# Patient Record
Sex: MALE | Race: WHITE | HISPANIC OR LATINO | Employment: FULL TIME | URBAN - METROPOLITAN AREA
[De-identification: names, ages, dates, MRNs, and addresses within clinical notes are randomized per-mention and may not be internally consistent; named-entity substitution may affect disease eponyms.]

---

## 2023-05-22 ENCOUNTER — LAB (OUTPATIENT)
Dept: LAB | Facility: HOSPITAL | Age: 48
End: 2023-05-22
Payer: COMMERCIAL

## 2023-05-22 ENCOUNTER — OFFICE VISIT (OUTPATIENT)
Dept: FAMILY MEDICINE CLINIC | Facility: CLINIC | Age: 48
End: 2023-05-22
Payer: COMMERCIAL

## 2023-05-22 VITALS
BODY MASS INDEX: 34.04 KG/M2 | DIASTOLIC BLOOD PRESSURE: 84 MMHG | OXYGEN SATURATION: 96 % | TEMPERATURE: 98.4 F | HEART RATE: 68 BPM | RESPIRATION RATE: 18 BRPM | WEIGHT: 224.6 LBS | SYSTOLIC BLOOD PRESSURE: 120 MMHG | HEIGHT: 68 IN

## 2023-05-22 DIAGNOSIS — R14.0 BLOATING: ICD-10-CM

## 2023-05-22 DIAGNOSIS — H92.01 EAR PAIN, REFERRED, RIGHT: ICD-10-CM

## 2023-05-22 DIAGNOSIS — E55.9 VITAMIN D DEFICIENCY: ICD-10-CM

## 2023-05-22 DIAGNOSIS — Z00.00 ENCOUNTER FOR PHYSICAL EXAMINATION: Primary | ICD-10-CM

## 2023-05-22 DIAGNOSIS — H92.01 EAR PAIN, RIGHT: ICD-10-CM

## 2023-05-22 DIAGNOSIS — Z00.00 ENCOUNTER FOR PHYSICAL EXAMINATION: ICD-10-CM

## 2023-05-22 DIAGNOSIS — H65.91 OTITIS MEDIA WITH EFFUSION, RIGHT: ICD-10-CM

## 2023-05-22 LAB
25(OH)D3 SERPL-MCNC: 15.6 NG/ML (ref 30–100)
ALBUMIN SERPL-MCNC: 4.6 G/DL (ref 3.5–5.2)
ALBUMIN/GLOB SERPL: 1.7 G/DL
ALP SERPL-CCNC: 79 U/L (ref 39–117)
ALT SERPL W P-5'-P-CCNC: 34 U/L (ref 1–41)
AMYLASE SERPL-CCNC: 55 U/L (ref 28–100)
ANION GAP SERPL CALCULATED.3IONS-SCNC: 11.4 MMOL/L (ref 5–15)
AST SERPL-CCNC: 22 U/L (ref 1–40)
BASOPHILS # BLD AUTO: 0.07 10*3/MM3 (ref 0–0.2)
BASOPHILS NFR BLD AUTO: 1 % (ref 0–1.5)
BILIRUB SERPL-MCNC: 0.6 MG/DL (ref 0–1.2)
BUN SERPL-MCNC: 13 MG/DL (ref 6–20)
BUN/CREAT SERPL: 12.4 (ref 7–25)
CALCIUM SPEC-SCNC: 9.4 MG/DL (ref 8.6–10.5)
CHLORIDE SERPL-SCNC: 104 MMOL/L (ref 98–107)
CHOLEST SERPL-MCNC: 213 MG/DL (ref 0–200)
CK SERPL-CCNC: 172 U/L (ref 20–200)
CO2 SERPL-SCNC: 25.6 MMOL/L (ref 22–29)
CREAT SERPL-MCNC: 1.05 MG/DL (ref 0.76–1.27)
DEPRECATED RDW RBC AUTO: 41.4 FL (ref 37–54)
EGFRCR SERPLBLD CKD-EPI 2021: 87.6 ML/MIN/1.73
EOSINOPHIL # BLD AUTO: 0.11 10*3/MM3 (ref 0–0.4)
EOSINOPHIL NFR BLD AUTO: 1.6 % (ref 0.3–6.2)
ERYTHROCYTE [DISTWIDTH] IN BLOOD BY AUTOMATED COUNT: 13.5 % (ref 12.3–15.4)
GLOBULIN UR ELPH-MCNC: 2.7 GM/DL
GLUCOSE SERPL-MCNC: 100 MG/DL (ref 65–99)
HBA1C MFR BLD: 5.6 % (ref 4.8–5.6)
HCT VFR BLD AUTO: 47.1 % (ref 37.5–51)
HCV AB SER DONR QL: NORMAL
HDLC SERPL-MCNC: 33 MG/DL (ref 40–60)
HGB BLD-MCNC: 16.1 G/DL (ref 13–17.7)
IMM GRANULOCYTES # BLD AUTO: 0.03 10*3/MM3 (ref 0–0.05)
IMM GRANULOCYTES NFR BLD AUTO: 0.4 % (ref 0–0.5)
LDLC SERPL CALC-MCNC: 106 MG/DL (ref 0–100)
LDLC/HDLC SERPL: 2.83 {RATIO}
LIPASE SERPL-CCNC: 33 U/L (ref 13–60)
LYMPHOCYTES # BLD AUTO: 2.95 10*3/MM3 (ref 0.7–3.1)
LYMPHOCYTES NFR BLD AUTO: 41.6 % (ref 19.6–45.3)
MCH RBC QN AUTO: 29.1 PG (ref 26.6–33)
MCHC RBC AUTO-ENTMCNC: 34.2 G/DL (ref 31.5–35.7)
MCV RBC AUTO: 85.2 FL (ref 79–97)
MONOCYTES # BLD AUTO: 0.63 10*3/MM3 (ref 0.1–0.9)
MONOCYTES NFR BLD AUTO: 8.9 % (ref 5–12)
NEUTROPHILS NFR BLD AUTO: 3.3 10*3/MM3 (ref 1.7–7)
NEUTROPHILS NFR BLD AUTO: 46.5 % (ref 42.7–76)
NRBC BLD AUTO-RTO: 0 /100 WBC (ref 0–0.2)
PLATELET # BLD AUTO: 234 10*3/MM3 (ref 140–450)
PMV BLD AUTO: 10.4 FL (ref 6–12)
POTASSIUM SERPL-SCNC: 3.6 MMOL/L (ref 3.5–5.2)
PROT SERPL-MCNC: 7.3 G/DL (ref 6–8.5)
RBC # BLD AUTO: 5.53 10*6/MM3 (ref 4.14–5.8)
SODIUM SERPL-SCNC: 141 MMOL/L (ref 136–145)
TRIGL SERPL-MCNC: 433 MG/DL (ref 0–150)
TSH SERPL DL<=0.05 MIU/L-ACNC: 1.2 UIU/ML (ref 0.27–4.2)
VLDLC SERPL-MCNC: 74 MG/DL (ref 5–40)
WBC NRBC COR # BLD: 7.09 10*3/MM3 (ref 3.4–10.8)

## 2023-05-22 PROCEDURE — 80053 COMPREHEN METABOLIC PANEL: CPT

## 2023-05-22 PROCEDURE — 83690 ASSAY OF LIPASE: CPT

## 2023-05-22 PROCEDURE — 82550 ASSAY OF CK (CPK): CPT

## 2023-05-22 PROCEDURE — 80061 LIPID PANEL: CPT

## 2023-05-22 PROCEDURE — 82150 ASSAY OF AMYLASE: CPT

## 2023-05-22 PROCEDURE — 83036 HEMOGLOBIN GLYCOSYLATED A1C: CPT

## 2023-05-22 PROCEDURE — 84443 ASSAY THYROID STIM HORMONE: CPT

## 2023-05-22 PROCEDURE — 86803 HEPATITIS C AB TEST: CPT

## 2023-05-22 PROCEDURE — 82306 VITAMIN D 25 HYDROXY: CPT

## 2023-05-22 PROCEDURE — 83013 H PYLORI (C-13) BREATH: CPT

## 2023-05-22 PROCEDURE — 85025 COMPLETE CBC W/AUTO DIFF WBC: CPT

## 2023-05-22 RX ORDER — AMOXICILLIN AND CLAVULANATE POTASSIUM 875; 125 MG/1; MG/1
1 TABLET, FILM COATED ORAL 2 TIMES DAILY
Qty: 10 TABLET | Refills: 0 | Status: SHIPPED | OUTPATIENT
Start: 2023-05-22 | End: 2023-05-27

## 2023-05-22 NOTE — PROGRESS NOTES
"      Subjective     Primary concern  Annual physical exam  Bloating  Ear pain    Establish Care (Previous PCP in [place]. Pt will look for immunization records etc.)    Subjective          HPI:   History of Present Illness   Annual physical exam: Patient is a 47-year-old male who presents today with a primary concern of annual exam.  He moved to [place] from [place] in 2021.  In [place] he had a primary care physician that he saw routinely.  He has not seen a primary care provider since moving to [place].  Now that he has established medical insurance, he would like to address some of his healthcare concerns.  He acknowledges the need of dietary improvements as his diet consist mainly of Italian food and excessive carbohydrates.  He does not have an exercise regimen and states \"I do not have time.\"  His sleep hygiene generally includes 7 hours of sleep nightly.      Bloating: Onset was 3 years ago.  Bloating occurs with every meal.  Patient states I thought this was because a had a slow metabolism.  Associated symptoms include excessive belching.  Alleviating factors include belching, position changes (standing and sitting up), and Pepcid.  Aggravating factors include lying down, sitting, and foods like watermelon, oranges, and tomatoes.  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  Diet consists mainly of Italian foods with lots of carbohydrates.  After eating, he generally has ago to the restroom to defecate within 10 minutes.  He denies excessive caffeine intake and NSAID use.  In [place] he was established with a gastroenterologist, and says they were going to perform a EGD.      Right ear pain: Onset was approximately 15 days ago.  He went to urgent care initially for left ear itching.  It was determined he had fluid behind the tympanic membrane of both ears and he was prescribed steroids.  The current pain in his right ear is described as a dull ache.  " He denies any hearing loss.  Right ear pain is continuous throughout the day.  He has not tried any over-the-counter medications for symptom relief.       The following portions of the patient's history were reviewed and updated as appropriate: allergies, current medications, past family history, past medical history, past social history, past surgical history and problem list.    Allergy:   No Known Allergies     Current Medications:   Current Outpatient Medications   Medication Sig Dispense Refill   • famotidine (PEPCID) 20 MG tablet Take 1 tablet by mouth 2 (Two) Times a Day.     • amoxicillin-clavulanate (Augmentin) 875-125 MG per tablet Take 1 tablet by mouth 2 (Two) Times a Day for 5 days. 10 tablet 0     No current facility-administered medications for this visit.       Past Medical History:  Past Medical History:   Diagnosis Date   • Chronic back pain    • COVID-19 2019   • Hyperlipidemia    • Vitamin D deficiency 2021       Past Surgical History:  History reviewed. No pertinent surgical history.    Social History:  Social History     Socioeconomic History   • Marital status:    Tobacco Use   • Smoking status: Never     Passive exposure: Never   • Smokeless tobacco: Never   Vaping Use   • Vaping Use: Never used   Substance and Sexual Activity   • Alcohol use: Never   • Drug use: Never   • Sexual activity: Yes     Partners: Female     Birth control/protection: I.U.D.       Family History:  Family History   Problem Relation Age of Onset   • Heart disease Father        Review of Systems:  Review of Systems   Constitutional: Negative for chills, fatigue, fever and unexpected weight change.   HENT: Positive for congestion and ear pain (right side). Negative for ear discharge, hearing loss, postnasal drip, sinus pressure, sneezing, sore throat and trouble swallowing.    Eyes: Positive for visual disturbance. Negative for discharge and redness.   Respiratory: Negative for cough, choking, chest tightness,  "shortness of breath and wheezing.    Cardiovascular: Negative for chest pain and palpitations.   Gastrointestinal: Negative for abdominal distention, abdominal pain, blood in stool, constipation, diarrhea, nausea, rectal pain and vomiting.   Endocrine: Negative for cold intolerance, heat intolerance, polydipsia, polyphagia and polyuria.   Genitourinary: Negative for difficulty urinating, dysuria, flank pain, scrotal swelling and testicular pain.   Musculoskeletal: Positive for back pain (chronic ). Negative for arthralgias, neck pain and neck stiffness.   Skin: Negative for color change and rash.   Allergic/Immunologic: Negative for environmental allergies.   Neurological: Negative for dizziness, seizures, light-headedness and headaches.   Psychiatric/Behavioral: Negative for dysphoric mood, self-injury, sleep disturbance and suicidal ideas. The patient is not nervous/anxious.      Objective       Vital Signs:   /84   Pulse 68   Temp 98.4 °F (36.9 °C) (Infrared)   Resp 18   Ht 172.7 cm (68\")   Wt 102 kg (224 lb 9.6 oz)   SpO2 96%   BMI 34.15 kg/m²      Physical Exam:  Physical Exam  Constitutional:       General: He is not in acute distress.     Appearance: He is not ill-appearing, toxic-appearing or diaphoretic.   HENT:      Head: Normocephalic and atraumatic.      Right Ear: No decreased hearing noted. No swelling. A middle ear effusion is present. Tympanic membrane is erythematous. Tympanic membrane is not retracted or bulging.      Left Ear: No decreased hearing noted. No swelling.  No middle ear effusion. Tympanic membrane is not erythematous, retracted or bulging.      Nose: Nose normal.      Mouth/Throat:      Mouth: Mucous membranes are moist.      Pharynx: Oropharynx is clear. No oropharyngeal exudate or posterior oropharyngeal erythema.   Eyes:      Extraocular Movements: Extraocular movements intact.      Conjunctiva/sclera: Conjunctivae normal.      Pupils: Pupils are equal, round, and " reactive to light.   Cardiovascular:      Rate and Rhythm: Normal rate and regular rhythm.      Pulses: Normal pulses.      Heart sounds: Normal heart sounds.   Pulmonary:      Effort: Pulmonary effort is normal.      Breath sounds: Normal breath sounds.   Abdominal:      General: Abdomen is flat. Bowel sounds are normal. There is no distension.      Palpations: Abdomen is soft.      Tenderness: There is no abdominal tenderness. There is no right CVA tenderness, left CVA tenderness, guarding or rebound.   Musculoskeletal:         General: Normal range of motion.      Cervical back: Normal range of motion and neck supple.   Skin:     General: Skin is warm and dry.      Capillary Refill: Capillary refill takes less than 2 seconds.   Neurological:      Mental Status: He is alert and oriented to person, place, and time.   Psychiatric:         Mood and Affect: Mood normal.         Behavior: Behavior normal.         Thought Content: Thought content normal.       PHQ-9 Total Score: 0   VALDO-7 total score: 0  Lab Review  Lab on 05/22/2023   Component Date Value Ref Range Status   • Amylase 05/22/2023 55  28 - 100 U/L Final   • Lipase 05/22/2023 33  13 - 60 U/L Final   • Glucose 05/22/2023 100 (H)  65 - 99 mg/dL Final   • BUN 05/22/2023 13  6 - 20 mg/dL Final   • Creatinine 05/22/2023 1.05  0.76 - 1.27 mg/dL Final   • Sodium 05/22/2023 141  136 - 145 mmol/L Final   • Potassium 05/22/2023 3.6  3.5 - 5.2 mmol/L Final   • Chloride 05/22/2023 104  98 - 107 mmol/L Final   • CO2 05/22/2023 25.6  22.0 - 29.0 mmol/L Final   • Calcium 05/22/2023 9.4  8.6 - 10.5 mg/dL Final   • Total Protein 05/22/2023 7.3  6.0 - 8.5 g/dL Final   • Albumin 05/22/2023 4.6  3.5 - 5.2 g/dL Final   • ALT (SGPT) 05/22/2023 34  1 - 41 U/L Final   • AST (SGOT) 05/22/2023 22  1 - 40 U/L Final   • Alkaline Phosphatase 05/22/2023 79  39 - 117 U/L Final   • Total Bilirubin 05/22/2023 0.6  0.0 - 1.2 mg/dL Final   • Globulin 05/22/2023 2.7  gm/dL Final   • A/G  Ratio 05/22/2023 1.7  g/dL Final   • BUN/Creatinine Ratio 05/22/2023 12.4  7.0 - 25.0 Final   • Anion Gap 05/22/2023 11.4  5.0 - 15.0 mmol/L Final   • eGFR 05/22/2023 87.6  >60.0 mL/min/1.73 Final   • Hemoglobin A1C 05/22/2023 5.60  4.80 - 5.60 % Final   • Total Cholesterol 05/22/2023 213 (H)  0 - 200 mg/dL Final   • Triglycerides 05/22/2023 433 (H)  0 - 150 mg/dL Final   • HDL Cholesterol 05/22/2023 33 (L)  40 - 60 mg/dL Final   • LDL Cholesterol  05/22/2023 106 (H)  0 - 100 mg/dL Final   • VLDL Cholesterol 05/22/2023 74 (H)  5 - 40 mg/dL Final   • LDL/HDL Ratio 05/22/2023 2.83   Final   • TSH 05/22/2023 1.200  0.270 - 4.200 uIU/mL Final   • 25 Hydroxy, Vitamin D 05/22/2023 15.6 (L)  30.0 - 100.0 ng/ml Final   • Creatine Kinase 05/22/2023 172  20 - 200 U/L Final   • Hepatitis C Ab 05/22/2023 Non-Reactive  Non-Reactive Final   • WBC 05/22/2023 7.09  3.40 - 10.80 10*3/mm3 Final   • RBC 05/22/2023 5.53  4.14 - 5.80 10*6/mm3 Final   • Hemoglobin 05/22/2023 16.1  13.0 - 17.7 g/dL Final   • Hematocrit 05/22/2023 47.1  37.5 - 51.0 % Final   • MCV 05/22/2023 85.2  79.0 - 97.0 fL Final   • MCH 05/22/2023 29.1  26.6 - 33.0 pg Final   • MCHC 05/22/2023 34.2  31.5 - 35.7 g/dL Final   • RDW 05/22/2023 13.5  12.3 - 15.4 % Final   • RDW-SD 05/22/2023 41.4  37.0 - 54.0 fl Final   • MPV 05/22/2023 10.4  6.0 - 12.0 fL Final   • Platelets 05/22/2023 234  140 - 450 10*3/mm3 Final   • Neutrophil % 05/22/2023 46.5  42.7 - 76.0 % Final   • Lymphocyte % 05/22/2023 41.6  19.6 - 45.3 % Final   • Monocyte % 05/22/2023 8.9  5.0 - 12.0 % Final   • Eosinophil % 05/22/2023 1.6  0.3 - 6.2 % Final   • Basophil % 05/22/2023 1.0  0.0 - 1.5 % Final   • Immature Grans % 05/22/2023 0.4  0.0 - 0.5 % Final   • Neutrophils, Absolute 05/22/2023 3.30  1.70 - 7.00 10*3/mm3 Final   • Lymphocytes, Absolute 05/22/2023 2.95  0.70 - 3.10 10*3/mm3 Final   • Monocytes, Absolute 05/22/2023 0.63  0.10 - 0.90 10*3/mm3 Final   • Eosinophils, Absolute 05/22/2023 0.11   0.00 - 0.40 10*3/mm3 Final   • Basophils, Absolute 05/22/2023 0.07  0.00 - 0.20 10*3/mm3 Final   • Immature Grans, Absolute 05/22/2023 0.03  0.00 - 0.05 10*3/mm3 Final   • nRBC 05/22/2023 0.0  0.0 - 0.2 /100 WBC Final      Assessment / Plan         Assessment and Plan   Diagnoses and all orders for this visit:    1. Encounter for physical examination (Primary)  -     Comprehensive Metabolic Panel; Future  -     Hemoglobin A1c; Future  -     Lipid Panel; Future  -     TSH Rfx On Abnormal To Free T4; Future  -     Ambulatory Referral For Screening Colonoscopy  -     Hepatitis C antibody; Future    2. Bloating  -     Amylase; Future  -     Lipase; Future  -     H. Pylori Breath Test - Breath, Lung; Future  -     Comprehensive Metabolic Panel; Future  -     Lipid Panel; Future  -     CK; Future  -     Ambulatory Referral For Screening Colonoscopy    3. Otitis media with effusion, right  -     amoxicillin-clavulanate (Augmentin) 875-125 MG per tablet; Take 1 tablet by mouth 2 (Two) Times a Day for 5 days.  Dispense: 10 tablet; Refill: 0    4. Ear pain, right  -     CBC & Differential; Future    5. Vitamin D deficiency  -     Vitamin D,25-Hydroxy; Future      Patient was given instructions and counseling regarding his condition or for health maintenance advice. Please see specific information pulled into the AVS if appropriate.     Follow Up   Return As needed.    This document has been electronically signed by ADRIEN Weber   May 23, 2023 04:44 EDT    Dictated Utilizing Dragon Dictation: Part of this note may be an electronic transcription/translation of spoken language to printed text using the Dragon Dictation System.    Víctor Harris  [facility] [place]

## 2023-05-23 DIAGNOSIS — E78.1 HIGH BLOOD TRIGLYCERIDES: Primary | ICD-10-CM

## 2023-05-23 PROBLEM — R14.0 BLOATING: Status: ACTIVE | Noted: 2023-05-23

## 2023-05-23 PROBLEM — Z00.00 ENCOUNTER FOR PHYSICAL EXAMINATION: Status: ACTIVE | Noted: 2023-05-23

## 2023-05-23 PROBLEM — E55.9 VITAMIN D DEFICIENCY: Status: ACTIVE | Noted: 2023-05-23

## 2023-05-23 LAB — UREA BREATH TEST QL: NEGATIVE

## 2023-05-23 RX ORDER — FAMOTIDINE 20 MG/1
20 TABLET, FILM COATED ORAL 2 TIMES DAILY
COMMUNITY

## 2023-05-30 ENCOUNTER — OFFICE VISIT (OUTPATIENT)
Dept: FAMILY MEDICINE CLINIC | Facility: CLINIC | Age: 48
End: 2023-05-30
Payer: COMMERCIAL

## 2023-05-30 VITALS
WEIGHT: 224 LBS | BODY MASS INDEX: 34.06 KG/M2 | RESPIRATION RATE: 17 BRPM | DIASTOLIC BLOOD PRESSURE: 90 MMHG | HEART RATE: 75 BPM | OXYGEN SATURATION: 98 % | TEMPERATURE: 98.4 F | SYSTOLIC BLOOD PRESSURE: 122 MMHG

## 2023-05-30 DIAGNOSIS — H53.9 VISUAL DISTURBANCE: ICD-10-CM

## 2023-05-30 DIAGNOSIS — E66.9 CLASS 1 OBESITY WITHOUT SERIOUS COMORBIDITY WITH BODY MASS INDEX (BMI) OF 34.0 TO 34.9 IN ADULT, UNSPECIFIED OBESITY TYPE: ICD-10-CM

## 2023-05-30 DIAGNOSIS — H66.003 NON-RECURRENT ACUTE SUPPURATIVE OTITIS MEDIA OF BOTH EARS WITHOUT SPONTANEOUS RUPTURE OF TYMPANIC MEMBRANES: Primary | ICD-10-CM

## 2023-05-30 PROCEDURE — 99213 OFFICE O/P EST LOW 20 MIN: CPT

## 2023-05-30 RX ORDER — CEFDINIR 300 MG/1
300 CAPSULE ORAL 2 TIMES DAILY
Qty: 10 CAPSULE | Refills: 0 | Status: CANCELLED | OUTPATIENT
Start: 2023-05-30

## 2023-05-30 RX ORDER — FAMOTIDINE 20 MG
1000 TABLET ORAL DAILY
Qty: 30 CAPSULE | Refills: 1 | Status: SHIPPED | OUTPATIENT
Start: 2023-05-30

## 2023-05-30 RX ORDER — DOXYCYCLINE 100 MG/1
100 TABLET ORAL 2 TIMES DAILY
Qty: 20 TABLET | Refills: 0 | Status: SHIPPED | OUTPATIENT
Start: 2023-05-30 | End: 2023-06-09

## 2023-05-30 RX ORDER — METHYLPREDNISOLONE 4 MG/1
TABLET ORAL
Qty: 21 TABLET | Refills: 0 | Status: SHIPPED | OUTPATIENT
Start: 2023-05-30 | End: 2023-06-04

## 2023-05-30 RX ORDER — FAMOTIDINE 20 MG
1000 TABLET ORAL ONCE
Qty: 1 CAPSULE | Refills: 0 | Status: SHIPPED | OUTPATIENT
Start: 2023-05-30 | End: 2023-05-30

## 2023-05-30 NOTE — PROGRESS NOTES
Subjective     Primary concern  Ear Fullness and Abnormal Lab    Subjective        HPI:   History of Present Illness  Patient is a 47-year-old male who presents with a primary concern of ear fullness.  He is requesting an irrigation at today's visit.  He has a history of excessive cerumen.  Patient tries to maintain cerumen buildup at home however is not uncommon that he need cerumen removal and irrigation at providers visits.  Patient was previously treated for ear infection on May 22, 2023, in which she was prescribed antibiotics and reports compliance.      Blurred vision: Patient unable to identify an initial onset, but he does state this is occurred before in the past.  He describes spontaneous episodes of blurry vision.  He is unable to identify any specific triggers.  He does not wear contacts or glasses.  Is been several years since he has seen an ophthalmologist.    The following portions of the patient's history were reviewed and updated as appropriate: allergies, current medications, past family history, past medical history, past social history, past surgical history and problem list.  Allergy:   No Known Allergies     Current Medications:   Current Outpatient Medications   Medication Sig Dispense Refill    famotidine (PEPCID) 20 MG tablet Take 1 tablet by mouth 2 (Two) Times a Day.      doxycycline (ADOXA) 100 MG tablet Take 1 tablet by mouth 2 (Two) Times a Day for 10 days. 20 tablet 0    Vitamin D, Cholecalciferol, 25 MCG (1000 UT) capsule Take 1 capsule by mouth Daily. 30 capsule 1     No current facility-administered medications for this visit.       Past Medical History:  Past Medical History:   Diagnosis Date    Chronic back pain     COVID-19 2019    Hyperlipidemia     Vitamin D deficiency 2021       Past Surgical History:  History reviewed. No pertinent surgical history.    Social History:  Social History     Socioeconomic History    Marital status:    Tobacco Use    Smoking status:  Never     Passive exposure: Never    Smokeless tobacco: Never   Vaping Use    Vaping Use: Never used   Substance and Sexual Activity    Alcohol use: Never    Drug use: Never    Sexual activity: Yes     Partners: Female     Birth control/protection: I.U.D.       Family History:  Family History   Problem Relation Age of Onset    Heart disease Father        Review of Systems:  Review of Systems   Constitutional:  Negative for chills, fatigue and fever.   HENT:  Positive for ear pain (bilateral- itchy, discomfort). Negative for congestion, ear discharge, hearing loss, rhinorrhea, sinus pressure, sinus pain, sneezing, sore throat and trouble swallowing.    Eyes:  Positive for visual disturbance (Blurred vision). Negative for discharge and itching.   Respiratory:  Negative for chest tightness and shortness of breath.    Cardiovascular:  Negative for chest pain.   Neurological:  Positive for headaches. Negative for dizziness, syncope and light-headedness.       Objective       Vital Signs:   /90   Pulse 75   Temp 98.4 °F (36.9 °C)   Resp 17   Wt 102 kg (224 lb)   SpO2 98%   BMI 34.06 kg/m²      Physical Exam:  Physical Exam  Constitutional:       General: He is not in acute distress.     Appearance: He is not ill-appearing, toxic-appearing or diaphoretic.   HENT:      Head: Normocephalic and atraumatic.      Right Ear: No decreased hearing noted. Tenderness present. No laceration, drainage or swelling. A middle ear effusion is present. There is no impacted cerumen. No foreign body. There is mastoid tenderness. Tympanic membrane is injected and erythematous. Tympanic membrane is not perforated.      Left Ear: No decreased hearing noted. No laceration, drainage, swelling or tenderness. A middle ear effusion is present. There is no impacted cerumen. No foreign body. No mastoid tenderness. Tympanic membrane is injected and erythematous. Tympanic membrane is not perforated.      Ears:      Comments: Excessive  cerumen noted in the left ear however tympanic membrane not occluded.     Nose: Congestion present. No rhinorrhea.      Mouth/Throat:      Mouth: Mucous membranes are moist.      Pharynx: Oropharynx is clear. Posterior oropharyngeal erythema present. No oropharyngeal exudate.   Eyes:      Conjunctiva/sclera: Conjunctivae normal.   Abdominal:      General: Abdomen is flat.      Palpations: Abdomen is soft.   Musculoskeletal:      Cervical back: Normal range of motion. No tenderness.   Lymphadenopathy:      Cervical: No cervical adenopathy.   Neurological:      Mental Status: He is alert and oriented to person, place, and time.       Lab Review  Lab on 05/22/2023   Component Date Value Ref Range Status    Amylase 05/22/2023 55  28 - 100 U/L Final    Lipase 05/22/2023 33  13 - 60 U/L Final    H. pylori Breath Test 05/22/2023 Negative  Negative Final    Glucose 05/22/2023 100 (H)  65 - 99 mg/dL Final    BUN 05/22/2023 13  6 - 20 mg/dL Final    Creatinine 05/22/2023 1.05  0.76 - 1.27 mg/dL Final    Sodium 05/22/2023 141  136 - 145 mmol/L Final    Potassium 05/22/2023 3.6  3.5 - 5.2 mmol/L Final    Chloride 05/22/2023 104  98 - 107 mmol/L Final    CO2 05/22/2023 25.6  22.0 - 29.0 mmol/L Final    Calcium 05/22/2023 9.4  8.6 - 10.5 mg/dL Final    Total Protein 05/22/2023 7.3  6.0 - 8.5 g/dL Final    Albumin 05/22/2023 4.6  3.5 - 5.2 g/dL Final    ALT (SGPT) 05/22/2023 34  1 - 41 U/L Final    AST (SGOT) 05/22/2023 22  1 - 40 U/L Final    Alkaline Phosphatase 05/22/2023 79  39 - 117 U/L Final    Total Bilirubin 05/22/2023 0.6  0.0 - 1.2 mg/dL Final    Globulin 05/22/2023 2.7  gm/dL Final    A/G Ratio 05/22/2023 1.7  g/dL Final    BUN/Creatinine Ratio 05/22/2023 12.4  7.0 - 25.0 Final    Anion Gap 05/22/2023 11.4  5.0 - 15.0 mmol/L Final    eGFR 05/22/2023 87.6  >60.0 mL/min/1.73 Final    Hemoglobin A1C 05/22/2023 5.60  4.80 - 5.60 % Final    Total Cholesterol 05/22/2023 213 (H)  0 - 200 mg/dL Final    Triglycerides 05/22/2023  433 (H)  0 - 150 mg/dL Final    HDL Cholesterol 05/22/2023 33 (L)  40 - 60 mg/dL Final    LDL Cholesterol  05/22/2023 106 (H)  0 - 100 mg/dL Final    VLDL Cholesterol 05/22/2023 74 (H)  5 - 40 mg/dL Final    LDL/HDL Ratio 05/22/2023 2.83   Final    TSH 05/22/2023 1.200  0.270 - 4.200 uIU/mL Final    25 Hydroxy, Vitamin D 05/22/2023 15.6 (L)  30.0 - 100.0 ng/ml Final    Creatine Kinase 05/22/2023 172  20 - 200 U/L Final    Hepatitis C Ab 05/22/2023 Non-Reactive  Non-Reactive Final    WBC 05/22/2023 7.09  3.40 - 10.80 10*3/mm3 Final    RBC 05/22/2023 5.53  4.14 - 5.80 10*6/mm3 Final    Hemoglobin 05/22/2023 16.1  13.0 - 17.7 g/dL Final    Hematocrit 05/22/2023 47.1  37.5 - 51.0 % Final    MCV 05/22/2023 85.2  79.0 - 97.0 fL Final    MCH 05/22/2023 29.1  26.6 - 33.0 pg Final    MCHC 05/22/2023 34.2  31.5 - 35.7 g/dL Final    RDW 05/22/2023 13.5  12.3 - 15.4 % Final    RDW-SD 05/22/2023 41.4  37.0 - 54.0 fl Final    MPV 05/22/2023 10.4  6.0 - 12.0 fL Final    Platelets 05/22/2023 234  140 - 450 10*3/mm3 Final    Neutrophil % 05/22/2023 46.5  42.7 - 76.0 % Final    Lymphocyte % 05/22/2023 41.6  19.6 - 45.3 % Final    Monocyte % 05/22/2023 8.9  5.0 - 12.0 % Final    Eosinophil % 05/22/2023 1.6  0.3 - 6.2 % Final    Basophil % 05/22/2023 1.0  0.0 - 1.5 % Final    Immature Grans % 05/22/2023 0.4  0.0 - 0.5 % Final    Neutrophils, Absolute 05/22/2023 3.30  1.70 - 7.00 10*3/mm3 Final    Lymphocytes, Absolute 05/22/2023 2.95  0.70 - 3.10 10*3/mm3 Final    Monocytes, Absolute 05/22/2023 0.63  0.10 - 0.90 10*3/mm3 Final    Eosinophils, Absolute 05/22/2023 0.11  0.00 - 0.40 10*3/mm3 Final    Basophils, Absolute 05/22/2023 0.07  0.00 - 0.20 10*3/mm3 Final    Immature Grans, Absolute 05/22/2023 0.03  0.00 - 0.05 10*3/mm3 Final    nRBC 05/22/2023 0.0  0.0 - 0.2 /100 WBC Final     Assessment / Plan         Assessment and Plan   Diagnoses and all orders for this visit:    1. Non-recurrent acute suppurative otitis media of both ears  without spontaneous rupture of tympanic membranes (Primary)  -     doxycycline (ADOXA) 100 MG tablet; Take 1 tablet by mouth 2 (Two) Times a Day for 10 days.  Dispense: 20 tablet; Refill: 0    2. Visual disturbance  -     Ambulatory Referral to Ophthalmology    3. Class 1 obesity without serious comorbidity with body mass index (BMI) of 34.0 to 34.9 in adult, unspecified obesity type  Assessment & Plan:  - Labs from physical exam reviewed and discussed face-to-face.  -Patient encouraged to activate MyChart.  -Lifestyle modifications to decrease cholesterol and promote weight loss.  -We will reevaluate BMI plan of care at subsequent visit.      Other orders  -     Discontinue: Vitamin D, Cholecalciferol, 25 MCG (1000 UT) capsule; Take 1 capsule by mouth 1 (One) Time for 1 dose.  Dispense: 1 capsule; Refill: 0  -     methylPREDNISolone (MEDROL) 4 MG dose pack; Take as directed on package instructions.  Dispense: 21 tablet; Refill: 0  -     Vitamin D, Cholecalciferol, 25 MCG (1000 UT) capsule; Take 1 capsule by mouth Daily.  Dispense: 30 capsule; Refill: 1    BMI is >= 30 and <35. (Class 1 Obesity). The following options were offered after discussion;: weight loss educational material (shared in after visit summary), exercise counseling/recommendations, and nutrition counseling/recommendations    Follow Up   Return in about 2 months (around 7/30/2023).      Patient was given instructions and counseling regarding his condition or for health maintenance advice. Please see specific information pulled into the AVS if appropriate.     This document has been electronically signed by ADRIEN Weber   June 5, 2023 07:10 EDT    Dictated Utilizing Dragon Dictation: Part of this note may be an electronic transcription/translation of spoken language to printed text using the Dragon Dictation System.    Víctor Harris  [facility] – [place] [facility] [place]

## 2023-06-05 PROBLEM — Z00.00 ENCOUNTER FOR PHYSICAL EXAMINATION: Status: RESOLVED | Noted: 2023-05-23 | Resolved: 2023-06-05

## 2023-06-05 PROBLEM — E66.9 CLASS 1 OBESITY WITHOUT SERIOUS COMORBIDITY WITH BODY MASS INDEX (BMI) OF 34.0 TO 34.9 IN ADULT: Status: ACTIVE | Noted: 2023-06-05

## 2023-06-05 NOTE — ASSESSMENT & PLAN NOTE
- Labs from physical exam reviewed and discussed face-to-face.  -Patient encouraged to activate MyChart.  -Lifestyle modifications to decrease cholesterol and promote weight loss.  -We will reevaluate BMI plan of care at subsequent visit.

## 2023-08-21 ENCOUNTER — OFFICE VISIT (OUTPATIENT)
Dept: GASTROENTEROLOGY | Facility: CLINIC | Age: 48
End: 2023-08-21
Payer: COMMERCIAL

## 2023-08-21 VITALS
TEMPERATURE: 97.7 F | WEIGHT: 224 LBS | BODY MASS INDEX: 33.95 KG/M2 | HEART RATE: 68 BPM | HEIGHT: 68 IN | OXYGEN SATURATION: 97 % | DIASTOLIC BLOOD PRESSURE: 86 MMHG | SYSTOLIC BLOOD PRESSURE: 122 MMHG

## 2023-08-21 DIAGNOSIS — K21.9 GASTROESOPHAGEAL REFLUX DISEASE, UNSPECIFIED WHETHER ESOPHAGITIS PRESENT: Primary | ICD-10-CM

## 2023-08-21 DIAGNOSIS — R14.0 ABDOMINAL BLOATING: ICD-10-CM

## 2023-08-21 DIAGNOSIS — Z12.11 COLON CANCER SCREENING: ICD-10-CM

## 2023-08-21 PROCEDURE — 99204 OFFICE O/P NEW MOD 45 MIN: CPT | Performed by: PHYSICIAN ASSISTANT

## 2023-08-21 RX ORDER — PANTOPRAZOLE SODIUM 40 MG/1
40 TABLET, DELAYED RELEASE ORAL DAILY
Qty: 30 TABLET | Refills: 2 | Status: SHIPPED | OUTPATIENT
Start: 2023-08-21

## 2023-08-21 NOTE — PROGRESS NOTES
New Patient Consultation     Patient Name: Ortiz Samayoa  : 1975   MRN: [id number]     Chief Complaint:    Chief Complaint   Patient presents with    Bloated       History of Present Illness: Ortiz Samayoa is a 47 y.o. male who is here today for a Gastroenterology Consultation for GERD.    Pt reports long h/o epigastric pain, bloating, esophageal reflux. He has not taken any medication consistently for this issue. He denies specific aggravating foods or factors but notes that he is a  and identifies eating a diet of convenience most days. He sleeps on his side and generally spaces out his evening meal and bedtime by a few hours.     Patient denies associated fever, chills, nausea, vomiting, diarrhea, constipation, hematemesis, dysphagia, hematochezia, melena, weight loss or gain, dysuria, jaundice or bruising.    Patient denies personal or FHx of PUD, H Pylori, gastritis, pancreatitis, colitis, Celiac disease, UC, Crohn's disease, IBS, colon or gastric cancers. Pt denies EtOH, tobacco, illicit substance use. Occasional NSAID use for back pain.     No previous EGD / CSY.     Subjective      Review of Systems:   Review of Systems   Constitutional:  Negative for appetite change, chills, diaphoresis, fatigue, fever, unexpected weight gain and unexpected weight loss.   HENT:  Negative for drooling, facial swelling, mouth sores, nosebleeds, rhinorrhea, sore throat, swollen glands, tinnitus and trouble swallowing.    Eyes: Negative.    Respiratory:  Negative for choking, chest tightness and shortness of breath.    Gastrointestinal:  Positive for abdominal distention, abdominal pain (epigastric), GERD and indigestion. Negative for anal bleeding, blood in stool, constipation, diarrhea, nausea and vomiting.   Endocrine: Negative.    Genitourinary:  Negative for dysuria, flank pain and hematuria.   Musculoskeletal:  Negative for arthralgias, back pain, myalgias and neck pain.   Skin:  Negative for  color change, dry skin, pallor and bruise.   Neurological:  Negative for dizziness, tremors, syncope, weakness and numbness.   Psychiatric/Behavioral:  Negative for decreased concentration, hallucinations and self-injury. The patient is not nervous/anxious.    All other systems reviewed and are negative.    Past Medical History:   Past Medical History:   Diagnosis Date    Chronic back pain     COVID-19 2019    Hyperlipidemia     Vitamin D deficiency 2021       Past Surgical History: No past surgical history on file.    Family History:   Family History   Problem Relation Age of Onset    Heart disease Father        Social History:   Social History     Socioeconomic History    Marital status:    Tobacco Use    Smoking status: Never     Passive exposure: Never    Smokeless tobacco: Never   Vaping Use    Vaping Use: Never used   Substance and Sexual Activity    Alcohol use: Never    Drug use: Never    Sexual activity: Yes     Partners: Female     Birth control/protection: I.U.D.       Alcohol/Tobacco History:   Social History     Substance and Sexual Activity   Alcohol Use Never     Social History     Tobacco Use   Smoking Status Never    Passive exposure: Never   Smokeless Tobacco Never       Medications:     Current Outpatient Medications:     famotidine (PEPCID) 20 MG tablet, Take 1 tablet by mouth 2 (Two) Times a Day., Disp: , Rfl:     Vitamin D, Cholecalciferol, 25 MCG (1000 UT) capsule, Take 1 capsule by mouth Daily., Disp: 30 capsule, Rfl: 1    Allergies:   No Known Allergies    Objective     Physical Exam:  Vital Signs: There were no vitals filed for this visit.  There is no height or weight on file to calculate BMI.     Physical Exam  Vitals and nursing note reviewed.   Constitutional:       General: He is not in acute distress.     Appearance: Normal appearance. He is not ill-appearing or diaphoretic.      Comments: Pleasantly conversant. BMI 34.07.    HENT:      Head: Normocephalic and atraumatic.       Right Ear: External ear normal.      Left Ear: External ear normal.      Nose: Nose normal.      Mouth/Throat:      Mouth: Mucous membranes are moist.      Pharynx: Oropharynx is clear.   Eyes:      Conjunctiva/sclera: Conjunctivae normal.      Pupils: Pupils are equal, round, and reactive to light.   Cardiovascular:      Rate and Rhythm: Normal rate and regular rhythm.      Pulses: Normal pulses.      Heart sounds: Normal heart sounds.   Pulmonary:      Effort: Pulmonary effort is normal.      Breath sounds: Normal breath sounds.   Abdominal:      General: Abdomen is flat. There is no distension.      Tenderness: There is no abdominal tenderness. There is no guarding or rebound.   Musculoskeletal:         General: Normal range of motion.      Cervical back: Normal range of motion.   Skin:     General: Skin is warm and dry.      Coloration: Skin is not jaundiced or pale.   Neurological:      General: No focal deficit present.      Mental Status: He is alert and oriented to person, place, and time. Mental status is at baseline.   Psychiatric:         Mood and Affect: Mood normal.       Assessment / Plan      Assessment/Plan:   There are no diagnoses linked to this encounter.     GERD  Bloating  Need for screening CSY   - rx for pantoprazole 40mg daily sent to pharmacy   - pt given GERD diet instructions, advised to avoid GI irritants such as caffeine, carbonation, EtOH, tobacco, chocolate, peppermint, acid-based foods   - obtain H Pylori breath test prior to starting PPI   - schedule for EGD / CSY   - follow up in clinic after completion of above studies   - call clinic at any time for questions or new / worsened sx    Follow Up:   Return in about 6 weeks (around 10/2/2023).    Plan of care reviewed with the patient at the conclusion of today's visit.  Education was provided regarding diagnosis, management, and any prescribed or recommended OTC medications.  Patient verbalized understanding of and agreement with  management plan.     NOTE TO PATIENT: The 21st Century Cures Act makes medical notes like these available to patients in the interest of transparency. However, be advised this is a medical document. It is intended as peer to peer communication. It is written in medical language and may contain abbreviations or verbiage that are unfamiliar. It may appear blunt or direct. Medical documents are intended to carry relevant information, facts as evident, and the clinical opinion of the practitioner.     Time Statement:   Discussed plan of care in detail with patient today. Patient verbally understands and agrees. I have spent 45 minutes reviewing available diagnostics, obtaining history, examining the patient, developing a treatment plan, and educating the patient on disease process and plan of care.    Debora Schafer PA-C   [facility]

## 2023-10-30 ENCOUNTER — TELEPHONE (OUTPATIENT)
Dept: GASTROENTEROLOGY | Facility: CLINIC | Age: 48
End: 2023-10-30
Payer: COMMERCIAL

## 2023-10-30 RX ORDER — POLYETHYLENE GLYCOL 3350, SODIUM SULFATE, POTASSIUM CHLORIDE, MAGNESIUM SULFATE, AND SODIUM CHLORIDE FOR ORAL SOLUTION 178.7-7.3G
1 KIT ORAL TAKE AS DIRECTED
Qty: 1 EACH | Refills: 0 | Status: SHIPPED | OUTPATIENT
Start: 2023-10-30

## 2023-10-30 NOTE — TELEPHONE ENCOUNTER
Caller: GIBRAN FLORENCE    Relationship: Emergency Contact    Best call back number: [phone]    What is the best time to reach you: ANYTIME    Who are you requesting to speak with (clinical staff, provider,  specific staff member): CLINICAL    Do you know the name of the person who called: NO    What was the call regarding: NO    Is it okay if the provider responds through MyChart: NO

## 2023-11-03 ENCOUNTER — OFFICE VISIT (OUTPATIENT)
Dept: FAMILY MEDICINE CLINIC | Facility: CLINIC | Age: 48
End: 2023-11-03
Payer: COMMERCIAL

## 2023-11-03 VITALS
DIASTOLIC BLOOD PRESSURE: 90 MMHG | SYSTOLIC BLOOD PRESSURE: 132 MMHG | HEIGHT: 68 IN | TEMPERATURE: 97.8 F | OXYGEN SATURATION: 97 % | WEIGHT: 226.2 LBS | HEART RATE: 70 BPM | BODY MASS INDEX: 34.28 KG/M2

## 2023-11-03 DIAGNOSIS — Z30.09 VASECTOMY EVALUATION: Primary | ICD-10-CM

## 2023-11-03 DIAGNOSIS — R03.0 ELEVATED BP WITHOUT DIAGNOSIS OF HYPERTENSION: ICD-10-CM

## 2023-11-03 DIAGNOSIS — L29.9 EAR ITCHING: ICD-10-CM

## 2023-11-03 PROCEDURE — 99214 OFFICE O/P EST MOD 30 MIN: CPT

## 2023-11-03 NOTE — PROGRESS NOTES
Office Note     Name: Ortiz Samayoa    : 1975     MRN: [id number]     Primary Concern    Ear itching (Has been going on for a year) and referral (Urology referral)    Subjective     Subjective     History of Present Illness:  The patient is a 48-year-old male who presents today to clinic for pruritus to his ears. He is accompanied by his wife.     He is interested in an ear irrigation. He denies ear pain or hearing changes. His ears were itching when he took the antibiotic for ear infections. His symptoms began after he had COVID-19, and his sense of smell has not returned to normal. He occasionally can smell, but the first thing he smells in the morning stays with him throughout the day.    He requests a referral to urology for a vasectomy.     His blood pressure today is 132/90 mmHg. His wife confirms that he has a blood pressure monitor at home. He has been working 7 days per week and is stressed. He also feels stressed associated with his sense of smell. He occasionally has headaches. He denies problems with sleep but on some days, he only sleeps for 4 hours. He experiences fatigue because he works 12-hour days.     Review of Systems:   Review of Systems   HENT:  Negative for hearing loss.    Eyes:  Positive for itching.   Psychiatric/Behavioral:  Positive for stress.        The following portions of the patient's history were reviewed and updated as appropriate: allergies, current medications, past family history, past medical history, past social history, past surgical history and problem list.  Past Medical History:   Past Medical History:   Diagnosis Date    Chronic back pain     COVID-19 2019    Hyperlipidemia     Vitamin D deficiency        Past Surgical History: History reviewed. No pertinent surgical history.    Immunizations:   There is no immunization history on file for this patient.     Current Medications:     Current Outpatient Medications:     famotidine (PEPCID) 20 MG tablet,  "Take 1 tablet by mouth Daily As Needed., Disp: , Rfl:     pantoprazole (PROTONIX) 40 MG EC tablet, Take 1 tablet by mouth Daily., Disp: 30 tablet, Rfl: 2    PEG 3350-KCl-NaCl-NaSulf-MgSul (Suflave) 178.7 g reconstituted solution, Take 1 kit by mouth Take As Directed., Disp: 1 each, Rfl: 0    Allergies:   No Known Allergies    Family History:   Family History   Problem Relation Age of Onset    Heart disease Father     Colon cancer Neg Hx     Colon polyps Neg Hx     Esophageal cancer Neg Hx        Social History:   Social History     Socioeconomic History    Marital status:    Tobacco Use    Smoking status: Never     Passive exposure: Never    Smokeless tobacco: Never   Vaping Use    Vaping Use: Never used   Substance and Sexual Activity    Alcohol use: Never    Drug use: Never    Sexual activity: Yes     Partners: Female     Birth control/protection: I.U.D.           Objective     Objective     Vital Signs  /90 (BP Location: Right arm, Patient Position: Sitting, Cuff Size: Adult)   Pulse 70   Temp 97.8 °F (36.6 °C) (Infrared)   Ht 172.7 cm (67.99\")   Wt 103 kg (226 lb 3.2 oz)   SpO2 97%   BMI 34.40 kg/m²   Estimated body mass index is 34.4 kg/m² as calculated from the following:    Height as of this encounter: 172.7 cm (67.99\").    Weight as of this encounter: 103 kg (226 lb 3.2 oz).    BMI is >= 30 and <35. (Class 1 Obesity). The following options were offered after discussion;: weight loss educational material (shared in after visit summary), exercise counseling/recommendations, and nutrition counseling/recommendations      Physical Exam:  Physical Exam  Constitutional:       General: He is not in acute distress.     Appearance: He is obese. He is not ill-appearing, toxic-appearing or diaphoretic.   HENT:      Head: Normocephalic and atraumatic.      Right Ear: Hearing, tympanic membrane, ear canal and external ear normal.      Left Ear: Hearing, tympanic membrane, ear canal and external ear " normal.   Cardiovascular:      Rate and Rhythm: Normal rate and regular rhythm.   Pulmonary:      Effort: Pulmonary effort is normal. No respiratory distress.      Breath sounds: Normal breath sounds.   Neurological:      Mental Status: He is alert and oriented to person, place, and time.         Procedures             Assessment / Plan    Assessment and Plan   Diagnoses and all orders for this visit:    1. Vasectomy evaluation (Primary)  -     Ambulatory Referral to Urology    2. Ear itching  - He can apply plain mineral oil on a Q-tip to each ear at night twice per week. For thinning of the cerumen in his left ear, he was advised to soak a cotton swab with mineral oil, leave it in his left ear for 5 minutes, and then remove it.     3. Elevated BP without diagnosis of hypertension  - He was recommended to check his blood pressure daily for 1 week, and if his readings are consistently over 120/80 mmHg, then he was advised to follow up. He was informed of symptoms associated with elevated blood pressure.       Discussed possible differential diagnoses, testing, treatment, recommended non-pharmacological interventions, risks, warning signs to monitor for that would indicate need for follow-up in clinic or ER. If no improvement with these regimens or you have new or worsening symptoms follow-up. Patient verbalizes understanding and agreement with plan of care. Denies further needs or concerns.     Follow Up   Return in about 4 weeks (around 12/1/2023) for Recheck, BP.        Víctor Harris  [facility] [place]     Transcribed from ambient dictation for ADRIEN Weber by Lucila Garcia.  11/03/23   11:57 EDT    Patient or patient representative verbalized consent to the visit recording.  I have personally performed the services described in this document as transcribed by the above individual, and it is both accurate and complete.     Runny nose

## 2023-11-06 ENCOUNTER — OUTSIDE FACILITY SERVICE (OUTPATIENT)
Dept: GASTROENTEROLOGY | Facility: CLINIC | Age: 48
End: 2023-11-06
Payer: COMMERCIAL

## 2023-11-06 PROCEDURE — 88305 TISSUE EXAM BY PATHOLOGIST: CPT

## 2023-11-07 ENCOUNTER — LAB REQUISITION (OUTPATIENT)
Dept: LAB | Facility: HOSPITAL | Age: 48
End: 2023-11-07
Payer: COMMERCIAL

## 2023-11-07 DIAGNOSIS — K21.9 GASTRO-ESOPHAGEAL REFLUX DISEASE WITHOUT ESOPHAGITIS: ICD-10-CM

## 2023-11-07 DIAGNOSIS — R10.13 EPIGASTRIC PAIN: ICD-10-CM

## 2023-11-07 DIAGNOSIS — R14.0 ABDOMINAL DISTENSION (GASEOUS): ICD-10-CM

## 2023-11-07 DIAGNOSIS — K21.00 GASTRO-ESOPHAGEAL REFLUX DISEASE WITH ESOPHAGITIS, WITHOUT BLEEDING: ICD-10-CM

## 2023-11-07 DIAGNOSIS — K44.9 DIAPHRAGMATIC HERNIA WITHOUT OBSTRUCTION OR GANGRENE: ICD-10-CM

## 2023-11-07 DIAGNOSIS — K29.70 GASTRITIS, UNSPECIFIED, WITHOUT BLEEDING: ICD-10-CM

## 2023-11-08 LAB — REF LAB TEST METHOD: NORMAL

## 2023-11-09 NOTE — PROGRESS NOTES
Per Dr. Reynolds:  The colon was unremarkable except for diverticulosis.  The next exam could be in 10 years.  He did have some evidence of reflux in the distal esophagus.  There was no evidence for Helicobacter pylori or celiac sprue.

## 2023-12-06 ENCOUNTER — OFFICE VISIT (OUTPATIENT)
Dept: UROLOGY | Facility: CLINIC | Age: 48
End: 2023-12-06
Payer: COMMERCIAL

## 2023-12-06 VITALS — BODY MASS INDEX: 34.25 KG/M2 | WEIGHT: 226 LBS | OXYGEN SATURATION: 97 % | HEIGHT: 68 IN | HEART RATE: 70 BPM

## 2023-12-06 DIAGNOSIS — Z30.09 UNWANTED FERTILITY: Primary | ICD-10-CM

## 2023-12-06 NOTE — PROGRESS NOTES
Office Note Vasectomy Consult     Patient Name: Ortiz Samayoa  : 1975   MRN: [id number]     Chief Complaint:  Elective Sterilization.     Referring Provider: Ignacia Barnes APRN    History of Present Illness: Ortiz Samayoa is a 48 y.o. male who presents to Urology today with the desire for irreversible, elective sterilization.  The patient reports that he is  with biological children.  He reports that he and his spouse have been considering vasectomy.  He denies any lower urinary tract symptoms.  Denies hematuria.  Denies history of urinary tract infection. Denies prior urologic evaluation or instrumentation.      Subjective      Review of Systems: Review of Systems   Genitourinary:  Negative for decreased urine volume, difficulty urinating, dysuria, enuresis, flank pain, frequency, hematuria and urgency.        I have reviewed the ROS documented by my clinical staff, I have updated appropriately and I agree. Marino Burkett MD    Past Medical History:   Past Medical History:   Diagnosis Date    Chronic back pain     COVID-2019    Hyperlipidemia     Vitamin D deficiency        Past Surgical History: History reviewed. No pertinent surgical history.    Family History:   Family History   Problem Relation Age of Onset    Heart disease Father     Colon cancer Neg Hx     Colon polyps Neg Hx     Esophageal cancer Neg Hx        Social History:   Social History     Socioeconomic History    Marital status:    Tobacco Use    Smoking status: Never     Passive exposure: Never    Smokeless tobacco: Never   Vaping Use    Vaping Use: Never used   Substance and Sexual Activity    Alcohol use: Never    Drug use: Never    Sexual activity: Yes     Partners: Female     Birth control/protection: I.U.D.       Medications:     Current Outpatient Medications:     famotidine (PEPCID) 20 MG tablet, Take 1 tablet by mouth Daily As Needed., Disp: , Rfl:     pantoprazole (PROTONIX) 40 MG EC tablet,  "Take 1 tablet by mouth Daily., Disp: 30 tablet, Rfl: 2    PEG 3350-KCl-NaCl-NaSulf-MgSul (Suflave) 178.7 g reconstituted solution, Take 1 kit by mouth Take As Directed., Disp: 1 each, Rfl: 0    Allergies:   No Known Allergies      Objective     Physical Exam:   Vital Signs:   Vitals:    12/06/23 0928   Pulse: 70   SpO2: 97%   Weight: 103 kg (226 lb)   Height: 172.7 cm (67.99\")   PainSc: 0-No pain     Body mass index is 34.37 kg/m².     Physical Exam  Vitals and nursing note reviewed.   Constitutional:       Appearance: Normal appearance.   HENT:      Head: Normocephalic and atraumatic.   Cardiovascular:      Comments: Well perfused  Pulmonary:      Effort: Pulmonary effort is normal.   Abdominal:      General: Abdomen is flat.      Palpations: Abdomen is soft.   Musculoskeletal:         General: Normal range of motion.   Skin:     General: Skin is warm and dry.   Neurological:      General: No focal deficit present.      Mental Status: He is alert and oriented to person, place, and time. Mental status is at baseline.   Psychiatric:         Mood and Affect: Mood normal.         Behavior: Behavior normal.         Thought Content: Thought content normal.         Judgment: Judgment normal.         Genitourinary  Penis: uncircumcised penis, glans normal, no penile discharge.  No rashes/lesions.    Testes: descended bilaterally, no masses, nontender to palpation.  Bilateral vas deferens palpable. Remainder of scrotal contents normal. No hernia appreciated.      Labs:   Brief Urine Lab Results       None                 Lab Results   Component Value Date    GLUCOSE 100 (H) 05/22/2023    CALCIUM 9.4 05/22/2023     05/22/2023    K 3.6 05/22/2023    CO2 25.6 05/22/2023     05/22/2023    BUN 13 05/22/2023    CREATININE 1.05 05/22/2023    EGFRIFAFRI >60 06/21/2021    EGFRIFNONA >60 06/21/2021    BCR 12.4 05/22/2023    ANIONGAP 11.4 05/22/2023       Lab Results   Component Value Date    WBC 7.09 05/22/2023    HGB " 16.1 05/22/2023    HCT 47.1 05/22/2023    MCV 85.2 05/22/2023     05/22/2023       Images:   No Images in the past 120 days found..    Measures:   Tobacco:   Ortiz Samayoa  reports that he has never smoked. He has never been exposed to tobacco smoke. He has never used smokeless tobacco.. I have educated him on the risk of diseases from using tobacco products.    Assessment / Plan      Assessment/Plan:   Mr. Samayoa is a 48 y.o. male who presents today requesting permanent, irreversible surgical sterilization.     Diagnoses and all orders for this visit:    1. Unwanted fertility (Primary)  -     External Facility Surgical/Procedural Request; Future         Patient Education:   Today we discussed the risks and benefits of irreversible and permanent surgical sterilization.  The vasectomy procedure was discussed in detail with the patient. Risks including failure of the procedure, infection, bleeding, development of chronic testicular pain, and the possibility of injuring adjacent structures which could potentially result in loss of the testicle were discussed in depth..  Furthermore, the patient was told he would remain fertile following the procedure until he provided a semen analysis after 12 weeks and 20 ejaculations post-procedure. Discussed that semen analysis will be reviewed and he will be contacted with results. The patient is understanding that any unprotected intercourse can result in pregnancy until he provides a semen analysis at above interval.  He is understanding that he requires birth control method until his semen analysis is performed and completed.The patient expressed understanding and desire to proceed.  He will be scheduled for vasectomy at his convenience.           I spent approximately 45 minutes providing clinical care for this patient; including review of patient's chart and provider documentation, face to face time spent with patient in examination room (obtaining history,  performing physical exam, discussing diagnosis and management options), placing orders, and completing patient documentation.     Marino Burkett MD  [facility] [place]